# Patient Record
Sex: MALE | Race: WHITE | Employment: FULL TIME | URBAN - METROPOLITAN AREA
[De-identification: names, ages, dates, MRNs, and addresses within clinical notes are randomized per-mention and may not be internally consistent; named-entity substitution may affect disease eponyms.]

---

## 2017-12-15 ENCOUNTER — OFFICE VISIT (OUTPATIENT)
Dept: PODIATRY | Facility: CLINIC | Age: 62
End: 2017-12-15
Payer: COMMERCIAL

## 2017-12-15 VITALS — HEIGHT: 72 IN | WEIGHT: 193 LBS | HEART RATE: 80 BPM | BODY MASS INDEX: 26.14 KG/M2

## 2017-12-15 DIAGNOSIS — M77.8 CAPSULITIS OF LEFT FOOT: Primary | ICD-10-CM

## 2017-12-15 PROCEDURE — 99203 OFFICE O/P NEW LOW 30 MIN: CPT | Performed by: PODIATRIST

## 2017-12-15 NOTE — LETTER
12/15/2017         RE: Merlin Addison  [address] [facility] [address] [facility] [address]        Dear Colleague,    Thank you for referring your patient, Merlin Addison, to the [facility]. Please see a copy of my visit note below.    PATIENT HISTORY:  Merlin Addison is a 62 year old male who presents to clinic for a painful left foot .  The patient describes the pain as sharp stabbing.  The patient relates the pain level is moderate.  The patient relates pain is located under the ball of the left foot.  The patient relates the pain has been present for the past several weeks.  The patient relates pain with ambulation.  The patient has tried different shoes with little relief.       REVIEW OF SYSTEMS:  Constitutional, HEENT, cardiovascular, pulmonary, GI, , musculoskeletal, neuro, skin, endocrine and psych systems are negative, except as otherwise noted.     PAST MEDICAL HISTORY:   Past Medical History:   Diagnosis Date     Gastro-oesophageal reflux disease      Numbness and tingling     right leg numbness     Other chronic pain     low back pain        PAST SURGICAL HISTORY:   Past Surgical History:   Procedure Laterality Date     FUSION LUMBAR ANTERIOR ONE LEVEL N/A 10/8/2014    Procedure: FUSION LUMBAR ANTERIOR ONE LEVEL;  Surgeon: Leonardo García MD;  Location:  OR     LAMINECTOMY, FUSION LUMBAR ONE LEVEL, COMBINED N/A 10/8/2014    Procedure: COMBINED LAMINECTOMY, FUSION LUMBAR ONE LEVEL;  Surgeon: Leonardo García MD;  Location:  OR     ORTHOPEDIC SURGERY      ORIF 5th (R) finger        MEDICATIONS:   Current Outpatient Prescriptions:      Ascorbic Acid (VITAMIN C PO), Take 1,000 mg by mouth daily, Disp: , Rfl:      vitamin  B complex with vitamin C (VITAMIN  B COMPLEX) TABS, Take 1 tablet by mouth daily, Disp: , Rfl:      Probiotic Product (SOLUBLE FIBER/PROBIOTICS PO), Take 1 capsule by mouth, Disp: , Rfl:      ALFALFA PO, Take 1 tablet by mouth daily, Disp: , Rfl:       "Calcium-Magnesium-Vitamin D (CALCIUM MAGNESIUM PO), Take 1 capsule by mouth daily, Disp: , Rfl:      Methylsulfonylmethane (MSM PO), Take 1 capsule by mouth daily, Disp: , Rfl:      Cholecalciferol (VITAMIN D3 PO), Take 1,000 Units by mouth daily , Disp: , Rfl:      Mtsmam-RmLsj-EmIazf-FA-Omega (MULTIVITAMIN/MINERALS PO), , Disp: , Rfl:      Omega-3 Fatty Acids (FISH OIL PO), , Disp: , Rfl:      POLICOSANOL PO, Take 10 mg by mouth daily , Disp: , Rfl:      TADALAFIL PO, , Disp: , Rfl:      Flaxseed, Linseed, (FLAXSEED OIL PO), Take 1 teaspoonful by mouth , Disp: , Rfl:      RANITIDINE HCL PO, Take 300 mg by mouth daily , Disp: , Rfl:      ALLERGIES:  No Known Allergies     SOCIAL HISTORY:   Social History     Social History     Marital status:      Spouse name: N/A     Number of children: N/A     Years of education: N/A     Occupational History     Not on file.     Social History Main Topics     Smoking status: Never Smoker     Smokeless tobacco: Not on file     Alcohol use Yes      Comment: 1-2 beers a week     Drug use: No     Sexual activity: Not on file     Other Topics Concern     Not on file     Social History Narrative        FAMILY HISTORY: No family history on file.     EXAM:Vitals: Pulse 80  Ht 5' 11.7\" (1.821 m)  Wt 193 lb (87.5 kg)  BMI 26.4 kg/m2  BMI= Body mass index is 26.4 kg/(m^2).    Weight management plan: Patient was referred to their PCP to discuss a diet and exercise plan.    General appearance: Patient is alert and fully cooperative with history & exam.  No sign of distress is noted during the visit.     Psychiatric: Affect is pleasant & appropriate.  Patient appears motivated to improve health.     Respiratory: Breathing is regular & unlabored while sitting.     HEENT: Hearing is intact to spoken word.  Speech is clear.  No gross evidence of visual impairment that would impact ambulation.     Dermatologic: Skin is intact to both lower extremities without significant lesions, rash or " abrasion.  No paronychia or evidence of soft tissue infection is noted.     Vascular: DP & PT pulses are intact & regular bilaterally.  No significant edema or varicosities noted.  CFT and skin temperature is normal to both lower extremities.     Neurologic: Lower extremity sensation is intact to light touch.  No evidence of weakness or contracture in the lower extremities.  No evidence of neuropathy.     Musculoskeletal: Patient is ambulatory without assistive device or brace.  No gross ankle deformity noted.  No foot or ankle joint effusion is noted.  Noted pain on palpation on the plantar aspect of the ball of the left foot. One is a positive Lachman's test of the second metatarsophalangeal joint on the left foot.         ASSESSMENT / PLAN:     ICD-10-CM    1. Capsulitis of left foot M77.52        I have explained to Merlin  about the conditions.  We discussed the nature of the condition as well as the treatment plan and expected length of recovery.  At this time, the patient was instructed on icing, stretching, tissue massage and support.  The patient was fitted with a Dynaflex insert that will aid in offloading the tension forces to the soft tissues and prevent further inflammation.   The patient will return in four weeks for reevaluation if the symptoms do not resolve.        Disclaimer: This note consists of symbols derived from keyboarding, dictation and/or voice recognition software. As a result, there may be errors in the script that have gone undetected. Please consider this when interpreting information found in this chart.       EUGENIO Ivey.P.M., F.DEBORAH.C.F.A.S.        Again, thank you for allowing me to participate in the care of your patient.        Sincerely,        Michael Santiago DPM

## 2017-12-15 NOTE — NURSING NOTE
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    Weight as of this encounter: 193 lb (87.5 kg).  Medication Reconciliation: complete     Rochelle Yun MA        "

## 2017-12-15 NOTE — MR AVS SNAPSHOT
"              After Visit Summary   12/15/2017    Merlin Addison    MRN: [id number]           Patient Information     Date Of Birth          1955        Visit Information        Provider Department      12/15/2017 12:00 PM Michael Santiago DPM [facility]        Care Instructions    Initial musculoskeletal treatment recommendation:    1.  Stretch the calf muscles as instructed once an hour.  2.  Ice the injured area in the evening; 20 min on/off.  3.  Take antiinflammatory medication as directed.  4.  Massage the soft tissues around the injured area in the morning to loosen the tissue  5.  Wear supportive foot wear and/or arch supports (rigid not cushion).      If no improvement in symptoms within four to six weeks, return to clinic for reevaluation.              Follow-ups after your visit        Who to contact     If you have questions or need follow up information about today's clinic visit or your schedule please contact [facility] directly at [phone].  Normal or non-critical lab and imaging results will be communicated to you by [employer], letter or phone within 4 business days after the clinic has received the results. If you do not hear from us within 7 days, please contact the clinic through [employer] or phone. If you have a critical or abnormal lab result, we will notify you by phone as soon as possible.  Submit refill requests through [employer] or call your pharmacy and they will forward the refill request to us. Please allow 3 business days for your refill to be completed.          Additional Information About Your Visit        [employer] Information     [employer] lets you send messages to your doctor, view your test results, renew your prescriptions, schedule appointments and more. To sign up, go to [email or web address] . Click on \"Log in\" on the left side of the screen, which will take you to the Welcome page. Then click on \"Sign up Now\" on the right side of " "the page.     You will be asked to enter the access code listed below, as well as some personal information. Please follow the directions to create your username and password.     Your access code is: [email or web address]  Expires: 3/15/2018 12:28 PM     Your access code will  in 90 days. If you need help or a new code, please call your [place] clinic or [phone].        Care EveryWhere ID     This is your Care EveryWhere ID. This could be used by other organizations to access your [place] medical records  [id number]        Your Vitals Were     Pulse Height BMI (Body Mass Index)             80 5' 11.7\" (1.821 m) 26.4 kg/m2          Blood Pressure from Last 3 Encounters:   10/12/14 126/76    Weight from Last 3 Encounters:   12/15/17 193 lb (87.5 kg)   10/08/14 193 lb (87.5 kg)              Today, you had the following     No orders found for display       Primary Care Provider Office Phone # Fax #    Denzel CABRERA Greer [phone]       [facility] [address]  [facility] [address]        Equal Access to Services     [facility]: Hadii aad ku hadasho Soomaali, waaxda luqadaha, qaybta kaalmada adeegyada, waxay idiin hayaan adeeg staceyarajules la'aan ah. So [facility] [phone].    ATENCIÓN: Si habla español, tiene a ramos disposición servicios gratuitos de asistencia lingüística. Llame al [phone].    We comply with applicable federal civil rights laws and [place] laws. We do not discriminate on the basis of race, color, national origin, age, disability, sex, sexual orientation, or gender identity.            Thank you!     Thank you for choosing [facility]  for your care. Our goal is always to provide you with excellent care. Hearing back from our patients is one way we can continue to improve our services. Please take a few minutes to complete the written survey that you may receive in the mail after your visit with us. Thank you!             Your Updated Medication List - " Protect others around you: Learn how to safely use, store and throw away your medicines at www.disposemymeds.org.          This list is accurate as of: 12/15/17 12:29 PM.  Always use your most recent med list.                   Brand Name Dispense Instructions for use Diagnosis    ALFALFA PO      Take 1 tablet by mouth daily        CALCIUM MAGNESIUM PO      Take 1 capsule by mouth daily        FISH OIL PO           FLAXSEED OIL PO      Take 1 teaspoonful by mouth        MSM PO      Take 1 capsule by mouth daily        MULTIVITAMIN/MINERALS PO           POLICOSANOL PO      Take 10 mg by mouth daily        RANITIDINE HCL PO      Take 300 mg by mouth daily        SOLUBLE FIBER/PROBIOTICS PO      Take 1 capsule by mouth        TADALAFIL PO           vitamin B complex with vitamin C Tabs tablet      Take 1 tablet by mouth daily        VITAMIN C PO      Take 1,000 mg by mouth daily        VITAMIN D3 PO      Take 1,000 Units by mouth daily

## 2017-12-15 NOTE — PROGRESS NOTES
PATIENT HISTORY:  Merlin Addison is a 62 year old male who presents to clinic for a painful left foot .  The patient describes the pain as sharp stabbing.  The patient relates the pain level is moderate.  The patient relates pain is located under the ball of the left foot.  The patient relates the pain has been present for the past several weeks.  The patient relates pain with ambulation.  The patient has tried different shoes with little relief.       REVIEW OF SYSTEMS:  Constitutional, HEENT, cardiovascular, pulmonary, GI, , musculoskeletal, neuro, skin, endocrine and psych systems are negative, except as otherwise noted.     PAST MEDICAL HISTORY:   Past Medical History:   Diagnosis Date     Gastro-oesophageal reflux disease      Numbness and tingling     right leg numbness     Other chronic pain     low back pain        PAST SURGICAL HISTORY:   Past Surgical History:   Procedure Laterality Date     FUSION LUMBAR ANTERIOR ONE LEVEL N/A 10/8/2014    Procedure: FUSION LUMBAR ANTERIOR ONE LEVEL;  Surgeon: Leonardo García MD;  Location:  OR     LAMINECTOMY, FUSION LUMBAR ONE LEVEL, COMBINED N/A 10/8/2014    Procedure: COMBINED LAMINECTOMY, FUSION LUMBAR ONE LEVEL;  Surgeon: Leonardo García MD;  Location:  OR     ORTHOPEDIC SURGERY      ORIF 5th (R) finger        MEDICATIONS:   Current Outpatient Prescriptions:      Ascorbic Acid (VITAMIN C PO), Take 1,000 mg by mouth daily, Disp: , Rfl:      vitamin  B complex with vitamin C (VITAMIN  B COMPLEX) TABS, Take 1 tablet by mouth daily, Disp: , Rfl:      Probiotic Product (SOLUBLE FIBER/PROBIOTICS PO), Take 1 capsule by mouth, Disp: , Rfl:      ALFALFA PO, Take 1 tablet by mouth daily, Disp: , Rfl:      Calcium-Magnesium-Vitamin D (CALCIUM MAGNESIUM PO), Take 1 capsule by mouth daily, Disp: , Rfl:      Methylsulfonylmethane (MSM PO), Take 1 capsule by mouth daily, Disp: , Rfl:      Cholecalciferol (VITAMIN D3 PO), Take 1,000 Units by mouth daily , Disp: ,  "Rfl:      Qlcdyq-TzVdy-FtRbhz-FA-Omega (MULTIVITAMIN/MINERALS PO), , Disp: , Rfl:      Omega-3 Fatty Acids (FISH OIL PO), , Disp: , Rfl:      POLICOSANOL PO, Take 10 mg by mouth daily , Disp: , Rfl:      TADALAFIL PO, , Disp: , Rfl:      Flaxseed, Linseed, (FLAXSEED OIL PO), Take 1 teaspoonful by mouth , Disp: , Rfl:      RANITIDINE HCL PO, Take 300 mg by mouth daily , Disp: , Rfl:      ALLERGIES:  No Known Allergies     SOCIAL HISTORY:   Social History     Social History     Marital status:      Spouse name: N/A     Number of children: N/A     Years of education: N/A     Occupational History     Not on file.     Social History Main Topics     Smoking status: Never Smoker     Smokeless tobacco: Not on file     Alcohol use Yes      Comment: 1-2 beers a week     Drug use: No     Sexual activity: Not on file     Other Topics Concern     Not on file     Social History Narrative        FAMILY HISTORY: No family history on file.     EXAM:Vitals: Pulse 80  Ht 5' 11.7\" (1.821 m)  Wt 193 lb (87.5 kg)  BMI 26.4 kg/m2  BMI= Body mass index is 26.4 kg/(m^2).    Weight management plan: Patient was referred to their PCP to discuss a diet and exercise plan.    General appearance: Patient is alert and fully cooperative with history & exam.  No sign of distress is noted during the visit.     Psychiatric: Affect is pleasant & appropriate.  Patient appears motivated to improve health.     Respiratory: Breathing is regular & unlabored while sitting.     HEENT: Hearing is intact to spoken word.  Speech is clear.  No gross evidence of visual impairment that would impact ambulation.     Dermatologic: Skin is intact to both lower extremities without significant lesions, rash or abrasion.  No paronychia or evidence of soft tissue infection is noted.     Vascular: DP & PT pulses are intact & regular bilaterally.  No significant edema or varicosities noted.  CFT and skin temperature is normal to both lower extremities.   "   Neurologic: Lower extremity sensation is intact to light touch.  No evidence of weakness or contracture in the lower extremities.  No evidence of neuropathy.     Musculoskeletal: Patient is ambulatory without assistive device or brace.  No gross ankle deformity noted.  No foot or ankle joint effusion is noted.  Noted pain on palpation on the plantar aspect of the ball of the left foot. One is a positive Lachman's test of the second metatarsophalangeal joint on the left foot.         ASSESSMENT / PLAN:     ICD-10-CM    1. Capsulitis of left foot M77.52        I have explained to Merlin  about the conditions.  We discussed the nature of the condition as well as the treatment plan and expected length of recovery.  At this time, the patient was instructed on icing, stretching, tissue massage and support.  The patient was fitted with a Dynaflex insert that will aid in offloading the tension forces to the soft tissues and prevent further inflammation.   The patient will return in four weeks for reevaluation if the symptoms do not resolve.        Disclaimer: This note consists of symbols derived from keyboarding, dictation and/or voice recognition software. As a result, there may be errors in the script that have gone undetected. Please consider this when interpreting information found in this chart.       LORA Santiago D.P.M., F.DEBORAH.C.F.A.S.

## 2018-12-04 ENCOUNTER — TELEPHONE (OUTPATIENT)
Dept: PODIATRY | Facility: CLINIC | Age: 63
End: 2018-12-04

## 2018-12-04 DIAGNOSIS — M77.8 CAPSULITIS OF LEFT FOOT: Primary | ICD-10-CM

## 2018-12-04 NOTE — TELEPHONE ENCOUNTER
"Reason for Call:  Other call back    Detailed comments: Pt states he was given some tear drop shaped pads and hard plastic plate to put in bottom of his shoe so toes don't bend when he walks.  Wondering if he can get another one for his other shoe \"kind of feels like I am walking with a short leg\" would like to try to balance it out.    Phone Number Patient can be reached at: Home number on file [phone] (home)    Best Time: any    Can we leave a detailed message on this number? YES- please do    Call taken on 12/4/2018 at 9:59 AM by Nati Coffman      "